# Patient Record
Sex: MALE | Race: WHITE
[De-identification: names, ages, dates, MRNs, and addresses within clinical notes are randomized per-mention and may not be internally consistent; named-entity substitution may affect disease eponyms.]

---

## 2019-04-22 ENCOUNTER — INBOUND DOCUMENT (OUTPATIENT)
Age: 69
End: 2019-04-22

## 2019-04-22 PROBLEM — Z00.00 ENCOUNTER FOR PREVENTIVE HEALTH EXAMINATION: Status: ACTIVE | Noted: 2019-04-22

## 2019-05-09 ENCOUNTER — APPOINTMENT (OUTPATIENT)
Dept: OTOLARYNGOLOGY | Facility: CLINIC | Age: 69
End: 2019-05-09
Payer: MEDICARE

## 2019-05-09 VITALS
SYSTOLIC BLOOD PRESSURE: 123 MMHG | WEIGHT: 239 LBS | HEIGHT: 68 IN | BODY MASS INDEX: 36.22 KG/M2 | DIASTOLIC BLOOD PRESSURE: 69 MMHG | HEART RATE: 90 BPM

## 2019-05-09 DIAGNOSIS — Z86.39 PERSONAL HISTORY OF OTHER ENDOCRINE, NUTRITIONAL AND METABOLIC DISEASE: ICD-10-CM

## 2019-05-09 DIAGNOSIS — R13.10 DYSPHAGIA, UNSPECIFIED: ICD-10-CM

## 2019-05-09 DIAGNOSIS — Z80.9 FAMILY HISTORY OF MALIGNANT NEOPLASM, UNSPECIFIED: ICD-10-CM

## 2019-05-09 DIAGNOSIS — E04.1 NONTOXIC SINGLE THYROID NODULE: ICD-10-CM

## 2019-05-09 DIAGNOSIS — I10 ESSENTIAL (PRIMARY) HYPERTENSION: ICD-10-CM

## 2019-05-09 PROCEDURE — 31575 DIAGNOSTIC LARYNGOSCOPY: CPT

## 2019-05-09 PROCEDURE — 99204 OFFICE O/P NEW MOD 45 MIN: CPT | Mod: 25

## 2019-05-09 RX ORDER — ROSUVASTATIN CALCIUM 20 MG/1
20 TABLET, FILM COATED ORAL
Refills: 0 | Status: ACTIVE | COMMUNITY

## 2019-05-09 RX ORDER — LIRAGLUTIDE 6 MG/ML
INJECTION SUBCUTANEOUS
Refills: 0 | Status: ACTIVE | COMMUNITY

## 2019-05-09 RX ORDER — ASPIRIN 81 MG/1
81 TABLET, CHEWABLE ORAL
Refills: 0 | Status: ACTIVE | COMMUNITY

## 2019-05-09 RX ORDER — OLMESARTAN MEDOXOMIL 40 MG/1
40 TABLET, FILM COATED ORAL
Refills: 0 | Status: ACTIVE | COMMUNITY

## 2019-05-09 RX ORDER — ALBUTEROL 90 MCG
90 AEROSOL (GRAM) INHALATION
Refills: 0 | Status: ACTIVE | COMMUNITY

## 2019-05-09 RX ORDER — EMPAGLIFLOZIN AND METFORMIN HYDROCHLORIDE 5; 1000 MG/1; MG/1
5-1000 TABLET ORAL
Refills: 0 | Status: ACTIVE | COMMUNITY

## 2019-05-09 NOTE — HISTORY OF PRESENT ILLNESS
[de-identified] : Referred by Dr. Fox for soft tissue fullness of the left hypopharynx.  CT neck done on 4/8/19.  US on 4/8/19 notes a L thyroid nodule.  Pt is having the thyroid biopsied next month by Dr. Abdullahi.  Pt is here for vocal cord paralysis and dysphagia.  CT scan also showed L hypopharyngeal fullness.\par Denies nasal obstruction/bleeding, fever, weakness or weight loss. Constant sore throat since March 2019. 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

## 2019-05-09 NOTE — CONSULT LETTER
[Dear  ___] : Dear  [unfilled], [Please see my note below.] : Please see my note below. [Consult Closing:] : Thank you very much for allowing me to participate in the care of this patient.  If you have any questions, please do not hesitate to contact me. [Consult Letter:] : I had the pleasure of evaluating your patient, [unfilled]. [Sincerely,] : Sincerely, [FreeTextEntry2] : Dr Doug Fox [FreeTextEntry3] : \par Brodie Meyers MD, FACS\par \par Otolaryngology-Head and Neck Surgery\par [employer] [facility]\par

## 2019-05-21 ENCOUNTER — APPOINTMENT (OUTPATIENT)
Dept: THORACIC SURGERY | Facility: CLINIC | Age: 69
End: 2019-05-21
Payer: MEDICARE

## 2019-05-21 VITALS
HEIGHT: 68 IN | SYSTOLIC BLOOD PRESSURE: 132 MMHG | OXYGEN SATURATION: 97 % | RESPIRATION RATE: 16 BRPM | TEMPERATURE: 98.8 F | DIASTOLIC BLOOD PRESSURE: 77 MMHG | HEART RATE: 100 BPM | WEIGHT: 238 LBS | BODY MASS INDEX: 36.07 KG/M2

## 2019-05-21 DIAGNOSIS — J38.00 PARALYSIS OF VOCAL CORDS AND LARYNX, UNSPECIFIED: ICD-10-CM

## 2019-05-21 DIAGNOSIS — Q34.1 CONGENITAL CYST OF MEDIASTINUM: ICD-10-CM

## 2019-05-21 DIAGNOSIS — Z87.891 PERSONAL HISTORY OF NICOTINE DEPENDENCE: ICD-10-CM

## 2019-05-21 DIAGNOSIS — Z82.5 FAMILY HISTORY OF ASTHMA AND OTHER CHRONIC LOWER RESPIRATORY DISEASES: ICD-10-CM

## 2019-05-21 PROCEDURE — 99205 OFFICE O/P NEW HI 60 MIN: CPT

## 2019-05-28 NOTE — PHYSICAL EXAM
[General Appearance - Alert] : alert [General Appearance - In No Acute Distress] : in no acute distress [Sclera] : the sclera and conjunctiva were normal [PERRL With Normal Accommodation] : pupils were equal in size, round, and reactive to light [Outer Ear] : the ears and nose were normal in appearance [Oropharynx] : the oropharynx was normal [Neck Appearance] : the appearance of the neck was normal [Respiration, Rhythm And Depth] : normal respiratory rhythm and effort [Heart Rate And Rhythm] : heart rate was normal and rhythm regular [Heart Sounds] : normal S1 and S2 [Examination Of The Chest] : the chest was normal in appearance [2+] : left 2+ [Breast Appearance] : normal in appearance [Bowel Sounds] : normal bowel sounds [Abdomen Soft] : soft [Abdomen Tenderness] : non-tender [Cervical Lymph Nodes Enlarged Posterior Bilaterally] : posterior cervical [Cervical Lymph Nodes Enlarged Anterior Bilaterally] : anterior cervical [Supraclavicular Lymph Nodes Enlarged Bilaterally] : supraclavicular [No CVA Tenderness] : no ~M costovertebral angle tenderness [Abnormal Walk] : normal gait [Nail Clubbing] : no clubbing  or cyanosis of the fingernails [Musculoskeletal - Swelling] : no joint swelling seen [Motor Tone] : muscle strength and tone were normal [Skin Color & Pigmentation] : normal skin color and pigmentation [Skin Turgor] : normal skin turgor [] : no rash [No Focal Deficits] : no focal deficits [Oriented To Time, Place, And Person] : oriented to person, place, and time [Impaired Insight] : insight and judgment were intact [Affect] : the affect was normal [FreeTextEntry1] : deferred

## 2019-05-28 NOTE — DATA REVIEWED
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, mildly increased in size since prior CT\par - stable left thyroid partially calcified nodule\par - trace paraseptal emphysematous changes in extreme lung apices \par - mild platelike subsegmental atelectasis in bilateral costophrenic sulci\par - 1.4 cm x 1.1 cm nodular lesion with mild calcification in posterior medial left lower lobe, favored to represent chronic mucus plugging\par - patchy opacity along the right minor fissure in the middle lobe with minimal linear and nodule changes in the adjacent right middle lobe\par - minimal benign nodular thickening along right minor fissure anteriorly\par - minimal scattered bronchial wall thickening with mild mucous plugging

## 2019-05-28 NOTE — ASSESSMENT
[FreeTextEntry1] : 69 yo male with PMH hypertension, hyperlipidemia, mediastinal cyst dx 2017, thyroid nodules, PNA 3/2019, and DM2.  Pt reports he developed voice hoarseness and some dysphagia since 3/2019, was seen by ENT, laryngeal endoscopy done, revealing left vocal cord paralysis. 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, mildly increased in size since prior CT\par - stable left thyroid partially calcified nodule\par - trace paraseptal emphysematous changes in extreme lung apices \par - mild platelike subsegmental atelectasis in bilateral costophrenic sulci\par - 1.4 cm x 1.1 cm nodular lesion with mild calcification in posterior medial left lower lobe, favored to represent chronic mucus plugging\par - patchy opacity along the right minor fissure in the middle lobe with minimal linear and nodule changes in the adjacent right middle lobe\par - minimal benign nodular thickening along right minor fissure anteriorly\par - minimal scattered bronchial wall thickening with mild mucous plugging\par \par I have reviewed the patient's medical records and diagnostic images at time of this office consultation and have discussed with patient at length that it can not be confirmed wether or not mediastinal cyst may have contributed to his ENT symptoms presently.  I further discussed that the mediastinal cyst is likely benign in nature, however, due to its size and recent growth, I recommended to have it removed by Right VATS or possible sternotomy. Risks, benefits and alternatives explained to patient, all questions answered.  Patient states he will consider surgery and call back within one month, after FNA of thyroid nodules. If patient wishes to proceed with surgery, will need PFTs and cardiac clearance.\par \par \par Written by Wing Deedee NP, acting as a scribe for Dr. Dank Blount.\par \par The documentation recorded by the scribe accurately reflects the service I personally performed and the decisions made by me. DANK BLOUNT MD\par

## 2019-05-28 NOTE — CONSULT LETTER
[Dear  ___] : Dear  [unfilled], [Consult Letter:] : I had the pleasure of evaluating your patient, [unfilled]. [( Thank you for referring [unfilled] for consultation for _____ )] : Thank you for referring [unfilled] for consultation for [unfilled] [Please see my note below.] : Please see my note below. [Consult Closing:] : Thank you very much for allowing me to participate in the care of this patient.  If you have any questions, please do not hesitate to contact me. [Sincerely,] : Sincerely, [DrBelinda  ___] : Dr. GALVAN [DrBelinda ___] : Dr. GALVAN [___] : [unfilled] [FreeTextEntry2] : Dr. Brodie Myeers (ENT)\par Dr. Andrei Lopez (PCP)\par Dr. Clayton (Cardio)\par Dr. Lencho Auguste (Pulm) [FreeTextEntry3] : Robert Haines MD, FACS \par Chief, Division of Thoracic Surgery \par Director, Minimally Invasive Thoracic Surgery \par Department of Cardiovascular and Thoracic Surgery \par [facility] \par , Cardiovascular and Thoracic Surgery \par [facility] [employer] [facility]

## 2019-05-28 NOTE — HISTORY OF PRESENT ILLNESS
[FreeTextEntry1] : 67 yo male with PMH hypertension, hyperlipidemia, mediastinal cyst dx 2017, thyroid nodules, PNA 3/2019, and DM2.  Pt reports he developed voice hoarseness and some dysphagia since 3/2019, was seen by ENT, laryngeal endoscopy done, revealing left vocal cord paralysis. 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, mildly increased in size since prior CT\par - stable left thyroid partially calcified nodule\par - trace paraseptal emphysematous changes in extreme lung apices \par - mild platelike subsegmental atelectasis in bilateral costophrenic sulci\par - 1.4 cm x 1.1 cm nodular lesion with mild calcification in posterior medial left lower lobe, favored to represent chronic mucus plugging\par - patchy opacity along the right minor fissure in the middle lobe with minimal linear and nodule changes in the adjacent right middle lobe\par - minimal benign nodular thickening along right minor fissure anteriorly\par - minimal scattered bronchial wall thickening with mild mucous plugging\par \par Pt is here today for Thoracic Sx consultation.  Pt was referred by ENT Brodie Navas to evaluate mediastinal cyst which may have contributed to vocal cord paralysis.  Pt reports voice hoarseness,  occasional dysphagia with liquids and chronic sputum in the back of the throat.  Pt also complaining of chronic dyspnea on exertion and cough.  Pt denies chest pain, fever, palpitations or unintentional weight loss.  Patient reports he has a scheduled FNA of thyroid nodules on 6/10/2019.

## 2019-07-25 ENCOUNTER — APPOINTMENT (OUTPATIENT)
Dept: OTOLARYNGOLOGY | Facility: CLINIC | Age: 69
End: 2019-07-25

## 2022-10-21 NOTE — REVIEW OF SYSTEMS
[Negative] : Heme/Lymph [As Noted in HPI] : as noted in HPI Complex Repair And Tissue Cultured Epidermal Autograft Text: The defect edges were debeveled with a #15 scalpel blade.  The primary defect was closed partially with a complex linear closure.  Given the location of the defect, shape of the defect and the proximity to free margins an tissue cultured epidermal autograft was deemed most appropriate to repair the remaining defect.  The graft was trimmed to fit the size of the remaining defect.  The graft was then placed in the primary defect, oriented appropriately, and sutured into place.